# Patient Record
Sex: FEMALE | Race: WHITE | NOT HISPANIC OR LATINO | Employment: OTHER | URBAN - METROPOLITAN AREA
[De-identification: names, ages, dates, MRNs, and addresses within clinical notes are randomized per-mention and may not be internally consistent; named-entity substitution may affect disease eponyms.]

---

## 2023-09-26 ENCOUNTER — TELEPHONE (OUTPATIENT)
Dept: ORTHOPEDICS | Facility: CLINIC | Age: 68
End: 2023-09-26
Payer: MEDICAID

## 2023-09-26 NOTE — TELEPHONE ENCOUNTER
Ms Leon had called for a 2nd opinion  she needs a tKA   Revision   original one done elsewhere in 2011 , then she had a revision by Dr Bay -fasciectomy   now she is having pain  I asked her weight   she said around 250 pounds  I told her our doctors do not do surgery unless the patient  BMI is much lower than hers is -  I offered a few [facility] [place], [place],   she declined=to far, I gave her our medicaid escalation number

## 2024-10-15 ENCOUNTER — TELEPHONE (OUTPATIENT)
Dept: CARDIOLOGY | Facility: CLINIC | Age: 69
End: 2024-10-15
Payer: MEDICARE

## 2024-10-15 NOTE — TELEPHONE ENCOUNTER
----- Message from Farrukh Shen sent at 10/14/2024  4:58 PM CDT -----  Regarding: FW: Appointment  Contact: [phone]    ----- Message -----  From: Dian Jerez  Sent: 10/14/2024   2:32 PM CDT  To: [facility] Clinical Support  Subject: Appointment                                      Calling to schedule an appointment per referral as soon as possible. Please call patient to schedule today.    [phone]

## 2024-10-17 ENCOUNTER — OFFICE VISIT (OUTPATIENT)
Dept: CARDIOLOGY | Facility: CLINIC | Age: 69
End: 2024-10-17
Payer: MEDICARE

## 2024-10-17 VITALS
HEART RATE: 62 BPM | BODY MASS INDEX: 41.48 KG/M2 | WEIGHT: 249 LBS | DIASTOLIC BLOOD PRESSURE: 79 MMHG | SYSTOLIC BLOOD PRESSURE: 119 MMHG | HEIGHT: 65 IN

## 2024-10-17 DIAGNOSIS — I87.2 VENOUS INSUFFICIENCY OF BOTH LOWER EXTREMITIES: ICD-10-CM

## 2024-10-17 DIAGNOSIS — I82.411 ACUTE DEEP VEIN THROMBOSIS (DVT) OF FEMORAL VEIN OF RIGHT LOWER EXTREMITY: Primary | ICD-10-CM

## 2024-10-17 DIAGNOSIS — I10 HYPERTENSION, UNSPECIFIED TYPE: ICD-10-CM

## 2024-10-17 DIAGNOSIS — E66.813 CLASS 3 OBESITY: ICD-10-CM

## 2024-10-17 DIAGNOSIS — I10 HYPERTENSION, UNSPECIFIED TYPE: Primary | ICD-10-CM

## 2024-10-17 PROCEDURE — 99999 PR PBB SHADOW E&M-EST. PATIENT-LVL III: CPT | Mod: PBBFAC,,, | Performed by: INTERNAL MEDICINE

## 2024-10-17 RX ORDER — METHOCARBAMOL 750 MG/1
1 TABLET, FILM COATED ORAL 4 TIMES DAILY
COMMUNITY

## 2024-10-17 RX ORDER — MECLIZINE HYDROCHLORIDE 25 MG/1
25 TABLET ORAL 2 TIMES DAILY PRN
COMMUNITY
Start: 2024-09-19

## 2024-10-17 RX ORDER — APIXABAN 5 MG/1
5 TABLET, FILM COATED ORAL 2 TIMES DAILY
COMMUNITY
Start: 2024-10-08

## 2024-10-17 RX ORDER — PRAVASTATIN SODIUM 20 MG/1
1 TABLET ORAL DAILY
COMMUNITY

## 2024-10-17 NOTE — PROGRESS NOTES
"Subjective:   @Patient ID:  Irvin Leon is a 69 y.o. female who presents for evaluation of DVT    HPI:   October 17, 2024:  Initial visit with me for DVT.  Had ultrasound routine for chronic lower extremity pain that showed DVT.  She was sent to ER.  She was initiated on Eliquis.  No recent long-distance struggles or recent surgery.  She is considered to be unprovoked.  She has chronic bilateral varicose veins for years        PMH:  Hx of uterine CA post Hysterectomy 30 yrs ago. HTN, thyroid, vertigo, HLP  SH: No tobacco abuse  FH: No hx of clotting problems      Prior cardiovascular  Hx  --------------------------------  Venous ultrasound October 2024 at outside hospital nonocclusive thrombus in the mid/distal right femoral vein      Arterial ultrasound February 2021 without significant stenosis             Patient Active Problem List    Diagnosis Date Noted    Class 3 obesity 10/17/2024    Pain of left lower extremity 01/06/2016    Difficulty walking 01/06/2016    Muscle weakness 01/06/2016    Left knee pain 01/04/2016                    LAST HbA1c  No results found for: "HGBA1C"    Lipid panel  No results found for: "CHOL"  No results found for: "HDL"  No results found for: "LDLCALC"  No results found for: "TRIG"  No results found for: "CHOLHDL"         Review of Systems   Constitutional: Negative for chills and fever.   HENT:  Negative for hearing loss and nosebleeds.    Eyes:  Negative for blurred vision.   Cardiovascular:         As in HPI   Respiratory:  Negative for hemoptysis and shortness of breath.    Hematologic/Lymphatic: Negative for bleeding problem.   Skin:  Negative for itching.   Musculoskeletal:         As in HPI   Gastrointestinal:  Negative for abdominal pain and hematochezia.   Genitourinary:  Negative for hematuria.   Neurological:  Negative for dizziness and loss of balance.   Psychiatric/Behavioral:  Negative for altered mental status and depression.        Objective:   Physical " Exam  Constitutional:       Appearance: She is well-developed. She is obese.   HENT:      Head: Normocephalic and atraumatic.   Eyes:      Conjunctiva/sclera: Conjunctivae normal.   Neck:      Vascular: No carotid bruit or JVD.   Cardiovascular:      Rate and Rhythm: Normal rate and regular rhythm.      Pulses:           Carotid pulses are 2+ on the right side and 2+ on the left side.       Radial pulses are 2+ on the right side and 2+ on the left side.        Dorsalis pedis pulses are 2+ on the right side and 1+ on the left side.      Heart sounds: Normal heart sounds. No murmur heard.     No friction rub. No gallop.   Pulmonary:      Effort: Pulmonary effort is normal. No respiratory distress.      Breath sounds: Normal breath sounds. No stridor. No wheezing.   Musculoskeletal:      Cervical back: Neck supple.   Skin:     General: Skin is warm and dry.      Comments: Bilateral varicosities   Neurological:      Mental Status: She is alert and oriented to person, place, and time.   Psychiatric:         Behavior: Behavior normal.         Assessment:     1. Acute deep vein thrombosis (DVT) of femoral vein of right lower extremity    2. Hypertension, unspecified type    3. Venous insufficiency of both lower extremities    4. Class 3 obesity        Plan:   New onset unprovoked right lower extremity DVT.  No significant edema or element of post thrombotic phenomenon.  We will recommend anticoagulation with Eliquis.  It is unprovoked.  Lifelong anticoagulation is reasonable especially given her cancer history    Refer to Hematology/Oncology team    Cancer screening discussed.  Due for mammogram and colonoscopy next year    Weight loss strongly advised    2D echocardiogram      Discussed with her and significant other.  All questions answered    Pertinent cardiac images and EKG reviewed independently.    Continue with current medical plan and lifestyle changes.  Return sooner for concerns or questions. If symptoms persist  go to the ED  I have reviewed all pertinent data including patient's medical history in detail and updated the computerized patient record.     Orders Placed This Encounter   Procedures    Ambulatory referral/consult to Hematology / Oncology     Standing Status:   Future     Standing Expiration Date:   11/17/2025     Referral Priority:   Routine     Referral Type:   Consultation     Referral Reason:   Specialty Services Required     Requested Specialty:   Hematology and Oncology     Number of Visits Requested:   1    CV US Lower Extremity Veins Bilateral Insufficiency     Standing Status:   Future     Standing Expiration Date:   10/17/2025     Order Specific Question:   Release to patient     Answer:   Immediate    Echo     Standing Status:   Future     Standing Expiration Date:   10/17/2025     Order Specific Question:   Release to patient     Answer:   Immediate       Follow up as scheduled.     She expressed verbal understanding and agreed with the plan    Patient's Medications   New Prescriptions    No medications on file   Previous Medications    ALBUTEROL SULFATE (PROAIR HFA INHL)    Inhale into the lungs.    CYCLOBENZAPRINE (FLEXERIL) 10 MG TABLET    Take 10 mg by mouth 3 (three) times daily as needed for Muscle spasms.    ELIQUIS 5 MG TAB    Take 5 mg by mouth 2 (two) times daily.    HYDROCODONE-ACETAMINOPHEN 5-325MG (NORCO) 5-325 MG PER TABLET    Take 1 tablet by mouth every 6 (six) hours as needed for Pain.    IBUPROFEN (ADVIL,MOTRIN) 800 MG TABLET    Take 800 mg by mouth 3 (three) times daily.    LEVOTHYROXINE (SYNTHROID) 100 MCG TABLET    Take 100 mcg by mouth once daily.    LISINOPRIL-HYDROCHLOROTHIAZIDE (PRINZIDE,ZESTORETIC) 20-12.5 MG PER TABLET    Take 1 tablet by mouth once daily.    MECLIZINE (ANTIVERT) 25 MG TABLET    Take 25 mg by mouth 2 (two) times daily as needed.    METHOCARBAMOL (ROBAXIN) 750 MG TAB    Take 1 tablet by mouth 4 (four) times daily.    POTASSIUM GLUCONATE 595 MG (99 MG) TAB     Take by mouth.    PRAVASTATIN (PRAVACHOL) 20 MG TABLET    Take 1 tablet by mouth once daily.    VITAMIN D 1000 UNITS TAB    Take 185 mg by mouth once daily.   Modified Medications    No medications on file   Discontinued Medications    No medications on file

## 2024-10-18 LAB
OHS QRS DURATION: 104 MS
OHS QTC CALCULATION: 446 MS

## 2024-10-25 ENCOUNTER — PATIENT MESSAGE (OUTPATIENT)
Dept: CARDIOLOGY | Facility: CLINIC | Age: 69
End: 2024-10-25
Payer: MEDICARE

## 2025-03-31 ENCOUNTER — OFFICE VISIT (OUTPATIENT)
Dept: HEMATOLOGY/ONCOLOGY | Facility: CLINIC | Age: 70
End: 2025-03-31
Attending: INTERNAL MEDICINE
Payer: MEDICARE

## 2025-03-31 ENCOUNTER — LAB VISIT (OUTPATIENT)
Dept: LAB | Facility: OTHER | Age: 70
End: 2025-03-31
Payer: MEDICARE

## 2025-03-31 VITALS
WEIGHT: 241.88 LBS | OXYGEN SATURATION: 96 % | RESPIRATION RATE: 18 BRPM | DIASTOLIC BLOOD PRESSURE: 63 MMHG | BODY MASS INDEX: 42.86 KG/M2 | TEMPERATURE: 98 F | HEIGHT: 63 IN | HEART RATE: 61 BPM | SYSTOLIC BLOOD PRESSURE: 126 MMHG

## 2025-03-31 DIAGNOSIS — I82.411 ACUTE EMBOLISM AND THROMBOSIS OF RIGHT FEMORAL VEIN: ICD-10-CM

## 2025-03-31 DIAGNOSIS — I82.411 ACUTE DEEP VEIN THROMBOSIS (DVT) OF FEMORAL VEIN OF RIGHT LOWER EXTREMITY: ICD-10-CM

## 2025-03-31 DIAGNOSIS — Z86.718 HISTORY OF DVT (DEEP VEIN THROMBOSIS): Primary | ICD-10-CM

## 2025-03-31 DIAGNOSIS — Z86.718 HISTORY OF DVT (DEEP VEIN THROMBOSIS): ICD-10-CM

## 2025-03-31 PROCEDURE — 36415 COLL VENOUS BLD VENIPUNCTURE: CPT

## 2025-03-31 PROCEDURE — 99214 OFFICE O/P EST MOD 30 MIN: CPT | Mod: PBBFAC | Performed by: INTERNAL MEDICINE

## 2025-03-31 PROCEDURE — 86147 CARDIOLIPIN ANTIBODY EA IG: CPT

## 2025-03-31 PROCEDURE — 86146 BETA-2 GLYCOPROTEIN ANTIBODY: CPT

## 2025-03-31 PROCEDURE — 99999 PR PBB SHADOW E&M-EST. PATIENT-LVL IV: CPT | Mod: PBBFAC,,, | Performed by: INTERNAL MEDICINE

## 2025-03-31 RX ORDER — DICLOFENAC SODIUM 30 MG/G
1 GEL TOPICAL DAILY PRN
COMMUNITY

## 2025-03-31 RX ORDER — GABAPENTIN 300 MG/1
300 CAPSULE ORAL NIGHTLY
COMMUNITY
Start: 2024-11-20

## 2025-03-31 NOTE — PROGRESS NOTES
Subjective     Patient ID: Irvin Leon is a 69 y.o. female.    Chief Complaint: No chief complaint on file.    HPI 69-year-old female referred for evaluation of unprovoked right lower extremity DVT.      She was diagnosed with DVT of the right lower extremity in 2024 (right femoral vein).  She had some right distal thigh pain at that time and some right leg swelling.  There was apparently no clear precipitating event leading to her DVT.  She reports no improvement in her symptoms after starting anticoagulation.  She does have some occasional right leg swelling when she is on her feet for long periods.  She has not had any unusual bleeding.  Other than her knee pain she has felt well.  She is due for colonoscopy this year.  She is due for mammograms in May.      Her past medical history is remarkable for uterine cancer 40 years ago  Hypertension, borderline diabetes, degenerative joint disease status post bilateral knee replacements    Family history: Negative for coagulation disorders but parents  when she was young    Social history: Nonsmoker,    Review of Systems   Constitutional: Negative.    Respiratory: Negative.     Cardiovascular:  Positive for leg swelling (right - dependent).   Gastrointestinal: Negative.    Musculoskeletal:  Positive for arthralgias (knees).   Neurological: Negative.    Psychiatric/Behavioral: Negative.            Objective     Physical Exam  Vitals reviewed.   Constitutional:       General: She is not in acute distress.     Appearance: Normal appearance. She is obese.   HENT:      Mouth/Throat:      Mouth: Mucous membranes are moist.      Pharynx: Oropharynx is clear. No oropharyngeal exudate or posterior oropharyngeal erythema.   Eyes:      General: No scleral icterus.  Cardiovascular:      Rate and Rhythm: Normal rate and regular rhythm.      Heart sounds: Murmur (early systolic-aortic) heard.   Pulmonary:      Effort: Pulmonary effort is normal. No respiratory distress.       Breath sounds: Normal breath sounds. No wheezing or rales.   Abdominal:      Palpations: Abdomen is soft. There is no mass.      Tenderness: There is no abdominal tenderness.   Musculoskeletal:      Right lower leg: No edema.      Left lower leg: Edema present.      Comments: Left calf 44 cm, right calf 43.5 cm   Lymphadenopathy:      Cervical: No cervical adenopathy.      Upper Body:      Right upper body: No supraclavicular or axillary adenopathy.      Left upper body: No supraclavicular or axillary adenopathy.   Skin:     Findings: No rash.   Neurological:      Mental Status: She is alert and oriented to person, place, and time.   Psychiatric:         Mood and Affect: Mood normal.         Thought Content: Thought content normal.         Judgment: Judgment normal.            Assessment and Plan     1. History of DVT (deep vein thrombosis)        Due for mammogram in May.  Follow-up for colonoscopy as well.        Will check for Factor 5 Leiden, Prothrombin mutations, anticardiolipin antibodies.  I will call her with those results.  More potent hereditary thrombotic disorders such as antithrombin 3, protein S or protein C or unlikely in this setting.  I discussed the need for indefinate anti-coagulation in the setting of an unprevoked DVT.     Route Chart for Scheduling    Med Onc Chart Routing      Follow up with physician No follow up needed.   Follow up with [facility]    Infusion scheduling note    Injection scheduling note    Labs None   Scheduling:  Preferred lab:  Lab interval:     Imaging None      Pharmacy appointment No pharmacy appointment needed      Other referrals       No additional referrals needed                  No follow-ups on file.

## 2025-04-02 LAB
MIXING STUDIES PPP-IMP: NORMAL
SCREEN DRVVT/NORMAL: 0.95 RATIO

## 2025-04-17 ENCOUNTER — HOSPITAL ENCOUNTER (OUTPATIENT)
Dept: CARDIOLOGY | Facility: HOSPITAL | Age: 70
Discharge: HOME | End: 2025-04-17
Attending: INTERNAL MEDICINE
Payer: MEDICARE

## 2025-04-17 DIAGNOSIS — I87.2 VENOUS INSUFFICIENCY OF BOTH LOWER EXTREMITIES: ICD-10-CM

## 2025-04-17 DIAGNOSIS — I82.411 ACUTE DEEP VEIN THROMBOSIS (DVT) OF FEMORAL VEIN OF RIGHT LOWER EXTREMITY: ICD-10-CM

## 2025-04-17 LAB
LEFT GREAT SAPHENOUS DISTAL THIGH DIA: 0.38 CM
LEFT GREAT SAPHENOUS JUNCTION DIA: 0.48 CM
LEFT GREAT SAPHENOUS KNEE DIA: 0.33 CM
LEFT GREAT SAPHENOUS KNEE REFLUX: 3408 MS
LEFT GREAT SAPHENOUS MIDDLE THIGH DIA: 0.43 CM
LEFT GREAT SAPHENOUS PROXIMAL CALF DIA: 0.33 CM
LEFT SMALL SAPHENOUS KNEE DIA: 0.3 CM
RIGHT GREAT SAPHENOUS DISTAL THIGH DIA: 0.3 CM
RIGHT GREAT SAPHENOUS JUNCTION DIA: 0.42 CM
RIGHT GREAT SAPHENOUS KNEE DIA: 0.3 CM
RIGHT GREAT SAPHENOUS MIDDLE THIGH DIA: 0.45 CM
RIGHT GREAT SAPHENOUS PROXIMAL CALF DIA: 0.27 CM
RIGHT SMALL SAPHENOUS KNEE DIA: 0.24 CM

## 2025-04-17 PROCEDURE — 93970 EXTREMITY STUDY: CPT | Mod: 26,,, | Performed by: INTERNAL MEDICINE

## 2025-04-17 PROCEDURE — 93970 EXTREMITY STUDY: CPT | Mod: TC

## 2025-04-21 ENCOUNTER — TELEPHONE (OUTPATIENT)
Dept: CARDIOLOGY | Facility: CLINIC | Age: 70
End: 2025-04-21
Payer: MEDICARE

## 2025-04-21 ENCOUNTER — RESULTS FOLLOW-UP (OUTPATIENT)
Dept: CARDIOLOGY | Facility: CLINIC | Age: 70
End: 2025-04-21

## 2025-04-21 NOTE — PROGRESS NOTES
Ultrasound results showed no blood clots.  You have some leaky valves of the vein that we will recommend compression stockings.     Sincerely,  Dario Goetz MD.   Interventional Cardiologist  [facility], [place]

## 2025-04-21 NOTE — TELEPHONE ENCOUNTER
----- Message from Dario Goetz MD sent at 4/21/2025  1:08 PM CDT -----  Ultrasound results showed no blood clots.  You have some leaky valves of the vein that we will recommend compression stockings.     Sincerely,  Dario Goetz MD.   Interventional Cardiologist  [facility], [place]   ----- Message -----  From: Dario Goetz MD  Sent: 4/17/2025   4:37 PM CDT  To: Dario Goetz MD

## 2025-04-23 ENCOUNTER — DOCUMENTATION ONLY (OUTPATIENT)
Dept: HEMATOLOGY/ONCOLOGY | Facility: CLINIC | Age: 70
End: 2025-04-23
Payer: MEDICARE

## 2025-04-23 ENCOUNTER — RESULTS FOLLOW-UP (OUTPATIENT)
Dept: CARDIOLOGY | Facility: CLINIC | Age: 70
End: 2025-04-23

## 2025-04-23 NOTE — PROGRESS NOTES
Hypercoagulable workup-factor 5 Leiden, prothrombin gene mutation, anticardiolipin, lupus anticoagulant, beta 2 glycoprotein were all normal.    I discussed these results with the patient by phone.  I emphasized to her that she should follow up with her mammogram-which has been scheduled, and her colonoscopy which is due later this year.    She will need to stay on anticoagulation due to the unprovoked nature of her thrombosis.